# Patient Record
Sex: FEMALE | Race: BLACK OR AFRICAN AMERICAN | NOT HISPANIC OR LATINO | Employment: OTHER | URBAN - METROPOLITAN AREA
[De-identification: names, ages, dates, MRNs, and addresses within clinical notes are randomized per-mention and may not be internally consistent; named-entity substitution may affect disease eponyms.]

---

## 2018-12-20 DIAGNOSIS — G47.33 OBSTRUCTIVE SLEEP APNEA (ADULT) (PEDIATRIC): Primary | ICD-10-CM

## 2019-01-11 ENCOUNTER — PROCEDURE VISIT (OUTPATIENT)
Dept: SLEEP MEDICINE | Facility: HOSPITAL | Age: 59
End: 2019-01-11
Attending: NURSE PRACTITIONER
Payer: MEDICAID

## 2019-01-11 DIAGNOSIS — G47.33 OBSTRUCTIVE SLEEP APNEA (ADULT) (PEDIATRIC): ICD-10-CM

## 2019-01-11 PROCEDURE — 95811 POLYSOM 6/>YRS CPAP 4/> PARM: CPT

## 2020-01-23 ENCOUNTER — TELEPHONE (OUTPATIENT)
Dept: PAIN MEDICINE | Facility: CLINIC | Age: 60
End: 2020-01-23

## 2020-01-23 NOTE — TELEPHONE ENCOUNTER
Spoke with patient on 1/22/2020, advised that office staff would have to verify that we are in-network with insurance. Insurance company states [facility] is out-of-network.  Spoke with patient, advised of this and patient voiced understanding.      ----- Message -----  From: Rocco Bro  Sent: 1/20/2020   3:54 PM CST  To: Dontrell Tinsley Staff ([place])    Type:  Sooner Apoointment Request    Caller is requesting a sooner appointment.  Caller declined first available appointment listed below.  Caller will not accept being placed on the waitlist and is requesting a message be sent to doctor.    Name of Caller: Ptnt   [phone]    When is the first available appointment?  No appmnt    Symptoms:  Neck pain, arthritis of spine numerous others.    Best Call Back Number:  Ptnt   [phone]    Additional Information:     Advised needs to establish treatment with [facility] due to her new insurance. Ptnt no long3r has medicaid.  Please call to schedule.